# Patient Record
Sex: MALE | Race: WHITE | Employment: FULL TIME | URBAN - METROPOLITAN AREA
[De-identification: names, ages, dates, MRNs, and addresses within clinical notes are randomized per-mention and may not be internally consistent; named-entity substitution may affect disease eponyms.]

---

## 2024-11-20 ENCOUNTER — HOSPITAL ENCOUNTER (INPATIENT)
Facility: HOSPITAL | Age: 54
LOS: 2 days | Discharge: HOME | End: 2024-11-22
Attending: EMERGENCY MEDICINE | Admitting: HOSPITALIST
Payer: COMMERCIAL

## 2024-11-20 ENCOUNTER — APPOINTMENT (OUTPATIENT)
Dept: GENERAL RADIOLOGY | Facility: HOSPITAL | Age: 54
End: 2024-11-20
Attending: EMERGENCY MEDICINE
Payer: COMMERCIAL

## 2024-11-20 ENCOUNTER — HOSPITAL ENCOUNTER (INPATIENT)
Facility: HOSPITAL | Age: 54
LOS: 2 days | Discharge: HOME | DRG: 638 | End: 2024-11-22
Attending: EMERGENCY MEDICINE | Admitting: HOSPITALIST
Payer: COMMERCIAL

## 2024-11-20 ENCOUNTER — APPOINTMENT (OUTPATIENT)
Dept: GENERAL RADIOLOGY | Facility: HOSPITAL | Age: 54
DRG: 638 | End: 2024-11-20
Attending: EMERGENCY MEDICINE
Payer: COMMERCIAL

## 2024-11-20 DIAGNOSIS — E11.65 TYPE 2 DIABETES MELLITUS WITH HYPERGLYCEMIA, WITHOUT LONG-TERM CURRENT USE OF INSULIN (HCC): Primary | ICD-10-CM

## 2024-11-20 LAB
ANION GAP SERPL CALC-SCNC: 9 MMOL/L (ref 0–18)
BASOPHILS # BLD AUTO: 0.06 X10(3) UL (ref 0–0.2)
BASOPHILS NFR BLD AUTO: 0.8 %
BILIRUB UR QL: NEGATIVE
BUN BLD-MCNC: 33 MG/DL (ref 9–23)
BUN/CREAT SERPL: 24.1 (ref 10–20)
CALCIUM BLD-MCNC: 10.2 MG/DL (ref 8.7–10.4)
CHLORIDE SERPL-SCNC: 100 MMOL/L (ref 98–112)
CLARITY UR: CLEAR
CO2 SERPL-SCNC: 20 MMOL/L (ref 21–32)
COLOR UR: COLORLESS
CREAT BLD-MCNC: 1.37 MG/DL
DEPRECATED RDW RBC AUTO: 42.5 FL (ref 35.1–46.3)
EGFRCR SERPLBLD CKD-EPI 2021: 62 ML/MIN/1.73M2 (ref 60–?)
EOSINOPHIL # BLD AUTO: 0.14 X10(3) UL (ref 0–0.7)
EOSINOPHIL NFR BLD AUTO: 1.9 %
ERYTHROCYTE [DISTWIDTH] IN BLOOD BY AUTOMATED COUNT: 13.7 % (ref 11–15)
EST. AVERAGE GLUCOSE BLD GHB EST-MCNC: 309 MG/DL (ref 68–126)
EST. AVERAGE GLUCOSE BLD GHB EST-MCNC: 309 MG/DL (ref 68–126)
GLUCOSE BLD-MCNC: 578 MG/DL (ref 70–99)
GLUCOSE BLDC GLUCOMTR-MCNC: 156 MG/DL (ref 70–99)
GLUCOSE BLDC GLUCOMTR-MCNC: 217 MG/DL (ref 70–99)
GLUCOSE BLDC GLUCOMTR-MCNC: 222 MG/DL (ref 70–99)
GLUCOSE BLDC GLUCOMTR-MCNC: 284 MG/DL (ref 70–99)
GLUCOSE BLDC GLUCOMTR-MCNC: 326 MG/DL (ref 70–99)
GLUCOSE BLDC GLUCOMTR-MCNC: 354 MG/DL (ref 70–99)
GLUCOSE BLDC GLUCOMTR-MCNC: 443 MG/DL (ref 70–99)
GLUCOSE BLDC GLUCOMTR-MCNC: 582 MG/DL (ref 70–99)
GLUCOSE UR-MCNC: >1000 MG/DL
HBA1C MFR BLD: 12.4 % (ref ?–5.7)
HBA1C MFR BLD: 12.4 % (ref ?–5.7)
HCT VFR BLD AUTO: 47.8 %
HGB BLD-MCNC: 15.8 G/DL
HGB UR QL STRIP.AUTO: NEGATIVE
IMM GRANULOCYTES # BLD AUTO: 0.03 X10(3) UL (ref 0–1)
IMM GRANULOCYTES NFR BLD: 0.4 %
KETONES UR-MCNC: NEGATIVE MG/DL
LEUKOCYTE ESTERASE UR QL STRIP.AUTO: NEGATIVE
LYMPHOCYTES # BLD AUTO: 1.81 X10(3) UL (ref 1–4)
LYMPHOCYTES NFR BLD AUTO: 25.2 %
MCH RBC QN AUTO: 27.9 PG (ref 26–34)
MCHC RBC AUTO-ENTMCNC: 33.1 G/DL (ref 31–37)
MCV RBC AUTO: 84.3 FL
MONOCYTES # BLD AUTO: 0.49 X10(3) UL (ref 0.1–1)
MONOCYTES NFR BLD AUTO: 6.8 %
NEUTROPHILS # BLD AUTO: 4.65 X10 (3) UL (ref 1.5–7.7)
NEUTROPHILS # BLD AUTO: 4.65 X10(3) UL (ref 1.5–7.7)
NEUTROPHILS NFR BLD AUTO: 64.9 %
NITRITE UR QL STRIP.AUTO: NEGATIVE
OSMOLALITY SERPL CALC.SUM OF ELEC: 302 MOSM/KG (ref 275–295)
PH UR: 5 [PH] (ref 5–8)
PLATELET # BLD AUTO: 288 10(3)UL (ref 150–450)
POTASSIUM SERPL-SCNC: 5 MMOL/L (ref 3.5–5.1)
PROT UR-MCNC: NEGATIVE MG/DL
RBC # BLD AUTO: 5.67 X10(6)UL
SODIUM SERPL-SCNC: 129 MMOL/L (ref 136–145)
SP GR UR STRIP: 1.03 (ref 1–1.03)
UROBILINOGEN UR STRIP-ACNC: NORMAL
WBC # BLD AUTO: 7.2 X10(3) UL (ref 4–11)

## 2024-11-20 PROCEDURE — 82962 GLUCOSE BLOOD TEST: CPT

## 2024-11-20 PROCEDURE — 96366 THER/PROPH/DIAG IV INF ADDON: CPT

## 2024-11-20 PROCEDURE — 71045 X-RAY EXAM CHEST 1 VIEW: CPT | Performed by: EMERGENCY MEDICINE

## 2024-11-20 PROCEDURE — 81003 URINALYSIS AUTO W/O SCOPE: CPT | Performed by: EMERGENCY MEDICINE

## 2024-11-20 PROCEDURE — 99291 CRITICAL CARE FIRST HOUR: CPT

## 2024-11-20 PROCEDURE — 87641 MR-STAPH DNA AMP PROBE: CPT | Performed by: HOSPITALIST

## 2024-11-20 PROCEDURE — 96361 HYDRATE IV INFUSION ADD-ON: CPT

## 2024-11-20 PROCEDURE — 80048 BASIC METABOLIC PNL TOTAL CA: CPT

## 2024-11-20 PROCEDURE — 99285 EMERGENCY DEPT VISIT HI MDM: CPT

## 2024-11-20 PROCEDURE — 85025 COMPLETE CBC W/AUTO DIFF WBC: CPT

## 2024-11-20 PROCEDURE — 85025 COMPLETE CBC W/AUTO DIFF WBC: CPT | Performed by: EMERGENCY MEDICINE

## 2024-11-20 PROCEDURE — 96365 THER/PROPH/DIAG IV INF INIT: CPT

## 2024-11-20 PROCEDURE — 83036 HEMOGLOBIN GLYCOSYLATED A1C: CPT | Performed by: HOSPITALIST

## 2024-11-20 PROCEDURE — 80048 BASIC METABOLIC PNL TOTAL CA: CPT | Performed by: EMERGENCY MEDICINE

## 2024-11-20 RX ORDER — ACETAMINOPHEN 500 MG
1000 TABLET ORAL EVERY 6 HOURS PRN
Status: DISCONTINUED | OUTPATIENT
Start: 2024-11-20 | End: 2024-11-22

## 2024-11-20 RX ORDER — ENOXAPARIN SODIUM 100 MG/ML
0.5 INJECTION SUBCUTANEOUS 2 TIMES DAILY
Status: DISCONTINUED | OUTPATIENT
Start: 2024-11-21 | End: 2024-11-22

## 2024-11-20 RX ORDER — SENNOSIDES 8.6 MG
17.2 TABLET ORAL NIGHTLY PRN
Status: DISCONTINUED | OUTPATIENT
Start: 2024-11-20 | End: 2024-11-22

## 2024-11-20 RX ORDER — ROSUVASTATIN CALCIUM 40 MG/1
40 TABLET, COATED ORAL DAILY
COMMUNITY

## 2024-11-20 RX ORDER — POLYETHYLENE GLYCOL 3350 17 G/17G
17 POWDER, FOR SOLUTION ORAL DAILY PRN
Status: DISCONTINUED | OUTPATIENT
Start: 2024-11-20 | End: 2024-11-22

## 2024-11-20 RX ORDER — BISACODYL 10 MG
10 SUPPOSITORY, RECTAL RECTAL
Status: DISCONTINUED | OUTPATIENT
Start: 2024-11-20 | End: 2024-11-22

## 2024-11-20 RX ORDER — GLIPIZIDE 10 MG/1
10 TABLET, FILM COATED, EXTENDED RELEASE ORAL DAILY
COMMUNITY
End: 2024-11-22

## 2024-11-20 RX ORDER — PIOGLITAZONE 15 MG/1
15 TABLET ORAL DAILY
COMMUNITY
End: 2024-11-22

## 2024-11-20 RX ORDER — ROSUVASTATIN CALCIUM 20 MG/1
40 TABLET, COATED ORAL DAILY
Status: DISCONTINUED | OUTPATIENT
Start: 2024-11-20 | End: 2024-11-22

## 2024-11-20 RX ORDER — ONDANSETRON 2 MG/ML
4 INJECTION INTRAMUSCULAR; INTRAVENOUS EVERY 6 HOURS PRN
Status: DISCONTINUED | OUTPATIENT
Start: 2024-11-20 | End: 2024-11-22

## 2024-11-20 RX ORDER — LISINOPRIL 5 MG/1
5 TABLET ORAL DAILY
COMMUNITY

## 2024-11-20 RX ORDER — NICOTINE POLACRILEX 4 MG
30 LOZENGE BUCCAL
Status: DISCONTINUED | OUTPATIENT
Start: 2024-11-20 | End: 2024-11-22

## 2024-11-20 RX ORDER — SODIUM CHLORIDE 9 MG/ML
INJECTION, SOLUTION INTRAVENOUS CONTINUOUS
Status: DISCONTINUED | OUTPATIENT
Start: 2024-11-20 | End: 2024-11-21

## 2024-11-20 RX ORDER — METFORMIN HYDROCHLORIDE 500 MG/1
500 TABLET, EXTENDED RELEASE ORAL DAILY
COMMUNITY

## 2024-11-20 RX ORDER — DEXTROSE MONOHYDRATE 25 G/50ML
50 INJECTION, SOLUTION INTRAVENOUS
Status: DISCONTINUED | OUTPATIENT
Start: 2024-11-20 | End: 2024-11-22

## 2024-11-20 RX ORDER — NICOTINE POLACRILEX 4 MG
15 LOZENGE BUCCAL
Status: DISCONTINUED | OUTPATIENT
Start: 2024-11-20 | End: 2024-11-22

## 2024-11-20 NOTE — ED INITIAL ASSESSMENT (HPI)
Pt ambulatory to ED A&O x 4 w/ c/o high blood sugar, fatigue, generalized weakness, polyuria x 1 week.  Pt hx T2DM, checked blood sugar at home and meter read, \"high.\"  Pt denies any n/v.

## 2024-11-20 NOTE — ED PROVIDER NOTES
Patient Seen in: [facility] Emergency Department      History     Chief Complaint   Patient presents with    Hyperglycemia     Stated Complaint: Hyperglycemia    Subjective:   HPI      54-year-old male with history of hypertension, diabetes, and hyperlipidemia presents with complaints of generalized weakness, fatigue, and elevated blood glucose levels.  The patient reports over the past week and a half he has had increased weakness, fatigue, and urinary frequency.  He checked his blood glucose level today and his meter read high.  He does not check his blood glucose levels often.  Presently on Actos and metformin for his diabetes.    Objective:     Past Medical History:    Diabetes (HCC)    Essential hypertension    Hyperlipidemia              History reviewed. No pertinent surgical history.             Social History     Socioeconomic History    Marital status:    Tobacco Use    Smoking status: Never    Smokeless tobacco: Never   Vaping Use    Vaping status: Never Used   Substance and Sexual Activity    Alcohol use: Never    Drug use: Never                  Physical Exam     ED Triage Vitals   BP 11/20/24 1050 136/71   Pulse 11/20/24 1050 85   Resp 11/20/24 1050 16   Temp 11/20/24 1051 98 °F (36.7 °C)   Temp src 11/20/24 1051 Temporal   SpO2 11/20/24 1050 96 %   O2 Device 11/20/24 1050 None (Room air)       Current Vitals:   Vital Signs  BP: 135/75  Pulse: 87  Resp: 16  Temp: 98 °F (36.7 °C)  Temp src: Temporal    Oxygen Therapy  SpO2: 95 %  O2 Device: None (Room air)        Physical Exam    General Appearance: alert, no distress  Eyes: pupils equal and round no pallor or injection  ENT, Mouth: mucous membranes moist  Respiratory: there are no retractions, lungs are clear to auscultation  Cardiovascular: regular rate and rhythm  Gastrointestinal:  abdomen is soft and non tender, no masses, bowel sounds normal  Neurological: Speech normal.  Moving extremities x 4.  Skin: warm and dry, no  rashes.  Musculoskeletal: neck is supple non tender        Extremities are symmetrical, full range of motion.  No leg edema or tenderness noted.  Psychiatric: patient is oriented X 3, there is no agitation    DIFFERENTIAL DIAGNOSIS: After history and physical exam differential diagnosis was considered for hyperglycemia, infectious etiology, or other        ED Course     Labs Reviewed   BASIC METABOLIC PANEL (8) - Abnormal; Notable for the following components:       Result Value    Glucose 578 (*)     Sodium 129 (*)     CO2 20.0 (*)     BUN 33 (*)     Creatinine 1.37 (*)     BUN/CREA Ratio 24.1 (*)     Calculated Osmolality 302 (*)     All other components within normal limits   POCT GLUCOSE - Abnormal; Notable for the following components:    POC Glucose  582 (*)     All other components within normal limits   CBC WITH DIFFERENTIAL WITH PLATELET   URINALYSIS WITH CULTURE REFLEX   RAINBOW DRAW LAVENDER   RAINBOW DRAW LIGHT GREEN   RAINBOW DRAW BLUE   RAINBOW DRAW GOLD                 MDM      Lab results noted.  Patient with hyperglycemia but no evidence of DKA.  Given the significant elevated glucose levels, will begin an insulin drip along with IV fluids.  Plan to admit to PCU.  Discussed with Dr. Rosas, hospitalist.  Also communicated with Dr. Fair, endocrinology.    I spent a total of 30 minutes of critical care time in obtaining history, performing a physical exam, bedside monitoring of interventions, collecting and interpreting tests and discussion with consultants but not including time spent performing procedures.      Admission disposition: 11/20/2024 12:40 PM           Medical Decision Making      Disposition and Plan     Clinical Impression:  1. Type 2 diabetes mellitus with hyperglycemia, without long-term current use of insulin (HCC)         Disposition:  Admit  11/20/2024 12:40 pm    Follow-up:  No follow-up provider specified.        Medications Prescribed:  Current Discharge Medication List               Supplementary Documentation:         Hospital Problems       Present on Admission             ICD-10-CM Noted POA    * (Principal) Type 2 diabetes mellitus with hyperglycemia, without long-term current use of insulin (HCC) E11.65 11/20/2024 Unknown

## 2024-11-20 NOTE — ED QUICK NOTES
Report given to next shift RN Swathi in ICU, endorsed care next BG check handed off for 1515. Pt transported in no acute distress, even respirations on continuous monitoring.

## 2024-11-20 NOTE — H&P
[facility] Hospitalist H&P       CC:   Chief Complaint   Patient presents with    Hyperglycemia        PCP: LESVIA MARTINEZ    History of Present Illness: Mr. Rivas is a 54 year old male with PMH sig for Type 2 DM, HLD, HTN, who presents with polyuria, polydipsia, fatigue.  Patient states for the past 2 weeks he has felt more fatigued, generally weak, noted sig polyuria and polydipsia, he notes a poor appetitie, and some nausea.  He denies vomiting, no CP or sob, no fevers or chills, no HA or vision changes.  He noted his blood sugar (he doesn't normally check) was 'high' used his wife's glucometer which stated 'high' as well so he came to the ER.  No other complaints.       Patient admits to drinking 4-5 cans of soda per day as well as cookies and candy on a daily basis.        PMH  Past Medical History:    Diabetes (HCC)    Essential hypertension    Hyperlipidemia        PSH  History reviewed. No pertinent surgical history.     ALL:  Allergies[1]     Home Medications:  Medications Taking[2]      Soc Hx  Social History     Tobacco Use    Smoking status: Never    Smokeless tobacco: Never   Substance Use Topics    Alcohol use: Never        Fam Hx  No family history on file.    Review of Systems  Comprehensive ROS reviewed and negative except for what's stated above.     OBJECTIVE:  /67   Pulse 84   Temp 98 °F (36.7 °C) (Temporal)   Resp 17   Ht 4' 11\" (1.499 m)   Wt 225 lb (102.1 kg)   SpO2 96%   BMI 45.44 kg/m²   General:  Alert, no distress   Head:  Normocephalic, without obvious abnormality, atraumatic.   Eyes:  Sclera anicteric, No conjunctival pallor,    Nose: Nares normal. Septum midline. Mucosa normal. No drainage.   Throat: Lips, mucosa, and tongue normal. Teeth and gums normal.   Neck: Supple,     Lungs:   Clear to auscultation bilaterally. Normal effort   Chest wall:  No tenderness or deformity.   Heart:  Regular rate and rhythm, S1, S2 normal, no murmur, rub or gallop appreciated    Abdomen:   Soft, non-tender. Bowel sounds normal. No masses,  No organomegaly. Non distended   Extremities: Extremities normal, atraumatic, no cyanosis or edema.   Skin: Skin color, texture, turgor normal. No rashes or lesions.    Neurologic: Normal strength, no focal deficit appreciated     Diagnostic Data:    CBC/Chem  Recent Labs   Lab 11/20/24  1133   WBC 7.2   HGB 15.8   MCV 84.3   .0       Recent Labs   Lab 11/20/24  1133   *   K 5.0      CO2 20.0*   BUN 33*   CREATSERUM 1.37*   *   CA 10.2       No results for input(s): \"ALT\", \"AST\", \"ALB\", \"AMYLASE\", \"LIPASE\", \"LDH\" in the last 168 hours.    Invalid input(s): \"ALPHOS\", \"TBIL\", \"DBIL\", \"TPROT\"    No results for input(s): \"TROP\" in the last 168 hours.     Radiology: XR CHEST AP PORTABLE  (CPT=71045)    Result Date: 11/20/2024  CONCLUSION:  1. No acute cardiopulmonary disease    Dictated by (CST): Brennan Kinsey MD on 11/20/2024 at 1:24 PM     Finalized by (CST): Brennan Kinsey MD on 11/20/2024 at 1:26 PM             ASSESSMENT / PLAN:    Mr. Rivas is a 54 year old male with PMH sig for Type 2 DM, HLD, HTN, who presents with polyuria, polydipsia, fatigue.     Hyperglycemia / uncontrolled type 2 DM  - has not had A1c done since 2023  - takes actose, metformin, glipizide, hold on admit  -  on admission, not in DKA  - patient has had sig dietary indiscretion, will have nutrition and DM educator meet with patient  - IVF, Insulin drip  - endo consulted  - likely insulin on DC  - no signs of infection    MALLORY  Pseudohyponatremia   - IVF  - hold ACE    HLD  - check lipids  - resume statin    Morbid obesity   - BMI 45  - maybe candidate for ozempic as OP  - fu with PCP    HTN  - resume ace on DC    FN:  - IVF: 100  - Diet: carb controlled    DVT Prophy:scd, lovneox  Lines: PIV    Dispo: pending clinical course    Discussed with wife at bedside     Outpatient records or previous hospital records reviewed.     Further  recommendations pending patient's clinical course.  [facility] hospitalist to continue to follow patient while in house    Patient and/or patient's family given opportunity to ask questions and note understanding and agreeing with therapeutic plan as outlined    Thank You,  Broderick Rosas MD    Hospitalist with [facility] Service number: [phone]         [1] No Known Allergies  [2]   Outpatient Medications Marked as Taking for the 11/20/24 encounter (Hospital Encounter)   Medication Sig Dispense Refill    pioglitazone 15 MG Oral Tab Take 1 tablet (15 mg total) by mouth daily.      lisinopril 5 MG Oral Tab Take 1 tablet (5 mg total) by mouth daily.      metFORMIN  MG Oral Tablet 24 Hr Take 1 tablet (500 mg total) by mouth daily.      rosuvastatin 40 MG Oral Tab Take 1 tablet (40 mg total) by mouth daily.      glipiZIDE ER 10 MG Oral Tablet 24 Hr Take 1 tablet (10 mg total) by mouth daily.

## 2024-11-20 NOTE — PLAN OF CARE
Pt is alert & or x 4, following cammands, pt has working glucometer at home. Sinus r. Accuchecks q 1 hr insulin drip continued. Home meds reviewed. Pt is npo. 0.9 at 100. Call light usage reeviewed. Much emitonol support.  Problem: Patient Centered Care  Goal: Patient preferences are identified and integrated in the patient's plan of care  Description: Interventions:  - What would you like us to know as we care for you?   - Provide timely, complete, and accurate information to patient/family  - Incorporate patient and family knowledge, values, beliefs, and cultural backgrounds into the planning and delivery of care  - Encourage patient/family to participate in care and decision-making at the level they choose  - Honor patient and family perspectives and choices  Outcome: Progressing     Problem: Diabetes/Glucose Control  Goal: Glucose maintained within prescribed range  Description: INTERVENTIONS:  - Monitor Blood Glucose as ordered  - Assess for signs and symptoms of hyperglycemia and hypoglycemia  - Administer ordered medications to maintain glucose within target range  - Assess barriers to adequate nutritional intake and initiate nutrition consult as needed  - Instruct patient on self management of diabetes  Outcome: Progressing     Problem: Patient/Family Goals  Goal: Patient/Family Long Term Goal  Description: Patient's Long Term Goal:     Interventions:  -  - See additional Care Plan goals for specific interventions  Outcome: Progressing  Goal: Patient/Family Short Term Goal  Description: Patient's Short Term Goal:    Interventions:     - See additional Care Plan goals for specific interventions  Outcome: Progressing     Problem: METABOLIC/FLUID AND ELECTROLYTES - ADULT  Goal: Glucose maintained within prescribed range  Description: INTERVENTIONS:  - Monitor Blood Glucose as ordered  - Assess for signs and symptoms of hyperglycemia and hypoglycemia  - Administer ordered medications to maintain glucose within  target range  - Assess barriers to adequate nutritional intake and initiate nutrition consult as needed  - Instruct patient on self management of diabetes  Outcome: Progressing  Goal: Electrolytes maintained within normal limits  Description: INTERVENTIONS:  - Monitor labs and rhythm and assess patient for signs and symptoms of electrolyte imbalances  - Administer electrolyte replacement as ordered  - Monitor response to electrolyte replacements, including rhythm and repeat lab results as appropriate  - Fluid restriction as ordered  - Instruct patient on fluid and nutrition restrictions as appropriate  Outcome: Progressing  Goal: Hemodynamic stability and optimal renal function maintained  Description: INTERVENTIONS:  - Monitor labs and assess for signs and symptoms of volume excess or deficit  - Monitor intake, output and patient weight  - Monitor urine specific gravity, serum osmolarity and serum sodium as indicated or ordered  - Monitor response to interventions for patient's volume status, including labs, urine output, blood pressure (other measures as available)  - Encourage oral intake as appropriate  - Instruct patient on fluid and nutrition restrictions as appropriate  Outcome: Progressing     Problem: SKIN/TISSUE INTEGRITY - ADULT  Goal: Skin integrity remains intact  Description: INTERVENTIONS  - Assess and document risk factors for pressure ulcer development  - Assess and document skin integrity  - Monitor for areas of redness and/or skin breakdown  - Initiate interventions, skin care algorithm/standards of care as needed  Outcome: Progressing

## 2024-11-20 NOTE — PROGRESS NOTES
enoxaparin adjusted per P&T protocol based on weight AND renal function  Estimated Creatinine Clearance: 90.1 mL/min (A) (based on SCr of 1.37 mg/dL (H)).   Body mass index is 45.44 kg/m².     *NOTE: If patient has BMI < 40 kg/m2 and CrCl < 30 mL/min, use Ivent Type: Renal Dose Adjustment; Subtype: Enoxaparin- Renal Adjustment   Agent Adjustment (BMI > 40 kg/m2)    CrCl > 30 mL/min CrCl < 30 mL/min Hemodialysis   Enoxaparin 0.5 mg/kg q12h 0.5 mg/kg once daily Heparin 5000 units subcutaneously q8h   Heparin 7500 units q8h* 5000 units q8h* 5000 units q8h*   * NOTE: if q12h is ordered, keep frequency at q12h

## 2024-11-21 LAB
ANION GAP SERPL CALC-SCNC: 7 MMOL/L (ref 0–18)
BASOPHILS # BLD AUTO: 0.05 X10(3) UL (ref 0–0.2)
BASOPHILS NFR BLD AUTO: 0.6 %
BUN BLD-MCNC: 22 MG/DL (ref 9–23)
BUN/CREAT SERPL: 22.2 (ref 10–20)
CALCIUM BLD-MCNC: 9.1 MG/DL (ref 8.7–10.4)
CHLORIDE SERPL-SCNC: 110 MMOL/L (ref 98–112)
CHOLEST SERPL-MCNC: 266 MG/DL (ref ?–200)
CO2 SERPL-SCNC: 22 MMOL/L (ref 21–32)
CREAT BLD-MCNC: 0.99 MG/DL
DEPRECATED RDW RBC AUTO: 43.8 FL (ref 35.1–46.3)
EGFRCR SERPLBLD CKD-EPI 2021: 91 ML/MIN/1.73M2 (ref 60–?)
EOSINOPHIL # BLD AUTO: 0.25 X10(3) UL (ref 0–0.7)
EOSINOPHIL NFR BLD AUTO: 3.1 %
ERYTHROCYTE [DISTWIDTH] IN BLOOD BY AUTOMATED COUNT: 14.1 % (ref 11–15)
GLUCOSE BLD-MCNC: 123 MG/DL (ref 70–99)
GLUCOSE BLDC GLUCOMTR-MCNC: 116 MG/DL (ref 70–99)
GLUCOSE BLDC GLUCOMTR-MCNC: 124 MG/DL (ref 70–99)
GLUCOSE BLDC GLUCOMTR-MCNC: 125 MG/DL (ref 70–99)
GLUCOSE BLDC GLUCOMTR-MCNC: 125 MG/DL (ref 70–99)
GLUCOSE BLDC GLUCOMTR-MCNC: 128 MG/DL (ref 70–99)
GLUCOSE BLDC GLUCOMTR-MCNC: 141 MG/DL (ref 70–99)
GLUCOSE BLDC GLUCOMTR-MCNC: 143 MG/DL (ref 70–99)
GLUCOSE BLDC GLUCOMTR-MCNC: 165 MG/DL (ref 70–99)
GLUCOSE BLDC GLUCOMTR-MCNC: 165 MG/DL (ref 70–99)
GLUCOSE BLDC GLUCOMTR-MCNC: 204 MG/DL (ref 70–99)
GLUCOSE BLDC GLUCOMTR-MCNC: 210 MG/DL (ref 70–99)
GLUCOSE BLDC GLUCOMTR-MCNC: 216 MG/DL (ref 70–99)
GLUCOSE BLDC GLUCOMTR-MCNC: 217 MG/DL (ref 70–99)
GLUCOSE BLDC GLUCOMTR-MCNC: 221 MG/DL (ref 70–99)
GLUCOSE BLDC GLUCOMTR-MCNC: 230 MG/DL (ref 70–99)
GLUCOSE BLDC GLUCOMTR-MCNC: 259 MG/DL (ref 70–99)
HCT VFR BLD AUTO: 43.9 %
HDLC SERPL-MCNC: 21 MG/DL (ref 40–59)
HGB BLD-MCNC: 14.6 G/DL
IMM GRANULOCYTES # BLD AUTO: 0.03 X10(3) UL (ref 0–1)
IMM GRANULOCYTES NFR BLD: 0.4 %
LDLC SERPL CALC-MCNC: 113 MG/DL (ref ?–100)
LYMPHOCYTES # BLD AUTO: 2.9 X10(3) UL (ref 1–4)
LYMPHOCYTES NFR BLD AUTO: 36.2 %
MAGNESIUM SERPL-MCNC: 2 MG/DL (ref 1.6–2.6)
MCH RBC QN AUTO: 28.3 PG (ref 26–34)
MCHC RBC AUTO-ENTMCNC: 33.3 G/DL (ref 31–37)
MCV RBC AUTO: 85.2 FL
MONOCYTES # BLD AUTO: 0.67 X10(3) UL (ref 0.1–1)
MONOCYTES NFR BLD AUTO: 8.4 %
MRSA DNA SPEC QL NAA+PROBE: NEGATIVE
NEUTROPHILS # BLD AUTO: 4.12 X10 (3) UL (ref 1.5–7.7)
NEUTROPHILS # BLD AUTO: 4.12 X10(3) UL (ref 1.5–7.7)
NEUTROPHILS NFR BLD AUTO: 51.3 %
NONHDLC SERPL-MCNC: 245 MG/DL (ref ?–130)
OSMOLALITY SERPL CALC.SUM OF ELEC: 293 MOSM/KG (ref 275–295)
PLATELET # BLD AUTO: 261 10(3)UL (ref 150–450)
POTASSIUM SERPL-SCNC: 3.5 MMOL/L (ref 3.5–5.1)
RBC # BLD AUTO: 5.15 X10(6)UL
SODIUM SERPL-SCNC: 139 MMOL/L (ref 136–145)
TRIGL SERPL-MCNC: 736 MG/DL (ref 30–149)
VLDLC SERPL CALC-MCNC: 137 MG/DL (ref 0–30)
WBC # BLD AUTO: 8 X10(3) UL (ref 4–11)

## 2024-11-21 PROCEDURE — 82962 GLUCOSE BLOOD TEST: CPT

## 2024-11-21 PROCEDURE — 80048 BASIC METABOLIC PNL TOTAL CA: CPT | Performed by: HOSPITALIST

## 2024-11-21 PROCEDURE — 80061 LIPID PANEL: CPT | Performed by: HOSPITALIST

## 2024-11-21 PROCEDURE — 83735 ASSAY OF MAGNESIUM: CPT | Performed by: HOSPITALIST

## 2024-11-21 PROCEDURE — 85025 COMPLETE CBC W/AUTO DIFF WBC: CPT | Performed by: HOSPITALIST

## 2024-11-21 RX ORDER — INSULIN DEGLUDEC 100 U/ML
50 INJECTION, SOLUTION SUBCUTANEOUS DAILY
Status: DISCONTINUED | OUTPATIENT
Start: 2024-11-21 | End: 2024-11-22

## 2024-11-21 NOTE — DISCHARGE INSTRUCTIONS
Please check your blood sugars closely.    Please follow up with your PCP and endocrinologist in 1 week    If INSULIN PEN is ordered at discharge, remember to remove TWO caps from the needle before injecting.     Diabetes:  Your A1C level is greater than 8%.  It is recommended that you attend an outpatient diabetes education program.  Please discuss with your Primary Care Provider at your next visit to obtain a referral.  If you wish to make an appointment at [facility], call [phone].

## 2024-11-21 NOTE — DIABETES ED
[facility]    Diabetes Education  Note    Javon Rivas III Patient Status:  Inpatient   1970 MRN [id number]  Location [facility] 2W/SW Attending Broderick Rosas MD  Hosp Day # 1 PCP LESVIA MARTINEZ      Labs:    HgbA1C (%)   Date Value   2024 12.4 (H)         Reason for Visit:Md order. New to insulin  Met with patient in room. He indicates he has had diabetes for several years, stopped using a glucose machine however takes diabetes medications as prescribed.    Discussed benefits of monitoring blood sugar and instructed on blood sugar targets. He is unsure of expiration date on test strips and encouraged him to review information.  Discussed benefits of Continuous Glucose monitor and encouraged him to request order from PCP.    He consumes 2 meals per day, drinks regular soda and consumes large portions of pasta, breads and rice.  Patient educated regarding diabetes diet basics. Discussed carbohydrate foods, portion sizes, and food label reading.  Handout given and initial meal plan provided. Encouraged to attend outpatient diabetes education. Questions answered. Receptive to information.    He has self administered insulin with RN supervision today.  He did a return demonstration of insulin technique with demo pad and pen without difficulty. Instructed on insulin storage, site selection and site rotation.He is agreeable to continue to self administer insulin with RN supervision during hospitalization.        Education Provided:  Benefits of testing BG as directed - record results and report to MD  How to inject using insulin pen and removing 2 caps from home insulin pen needle  Hypoglycemia symptoms/treatment/prevention  Actions of long and rapid acting insulins  Basic Diet Guidelines  Beginning carb counting - initial meal plan provided  Importance of close follow up with PCP and medical team  Benefits of physical activity     Patient verbalized understanding and was  receptive to information provided.      Recommendations:  Patient to self-administer all insulin doses with RN supervision   Attend outpatient diabetes education          Lennie Miller RN  Diabetes Educator  11/21/2024  3:01 PM

## 2024-11-21 NOTE — PLAN OF CARE
Pt is alert & or x r. Following cammands, sinus r.  Pt is getting lovenox daily. Pt was on insulin drip,  now off. Reviewed diabetic booklet with pt. Diet. Showed pt how to draw up insulin from novolog and degludec  insulin pens, With diabetic booklet at bedside.  Pt was able to demonstrate how to use both insulin pens, how to clean abdomen, cleanse site. Ask dr in future about CGM for future monitoring. Pt has working glucometer at home.  Eating a low carb diet. Looking at labels of food. Reviewed call light usage. Emotional support. Saline lock iv site. Pt will transfer to medical floor when bed is available. Reviewed call light usage. No c/o of pain. No skin issues.  Problem: Patient Centered Care  Goal: Patient preferences are identified and integrated in the patient's plan of care  Description: Interventions:  - What would you like us to know as we care for you?  - Provide timely, complete, and accurate information to patient/family  - Incorporate patient and family knowledge, values, beliefs, and cultural backgrounds into the planning and delivery of care  - Encourage patient/family to participate in care and decision-making at the level they choose  - Honor patient and family perspectives and choices  Outcome: Progressing     Problem: Diabetes/Glucose Control  Goal: Glucose maintained within prescribed range  Description: INTERVENTIONS:  - Monitor Blood Glucose as ordered  - Assess for signs and symptoms of hyperglycemia and hypoglycemia  - Administer ordered medications to maintain glucose within target range  - Assess barriers to adequate nutritional intake and initiate nutrition consult as needed  - Instruct patient on self management of diabetes  Outcome: Progressing     Problem: Patient/Family Goals  Goal: Patient/Family Long Term Goal  Description: Patient's Long Term Goal:    Interventions:    - See additional Care Plan goals for specific interventions  Outcome: Progressing  Goal: Patient/Family Short  Term Goal  Description: Patient's Short Term Goal:    Interventions:   - See additional Care Plan goals for specific interventions  Outcome: Progressing     Problem: METABOLIC/FLUID AND ELECTROLYTES - ADULT  Goal: Glucose maintained within prescribed range  Description: INTERVENTIONS:  - Monitor Blood Glucose as ordered  - Assess for signs and symptoms of hyperglycemia and hypoglycemia  - Administer ordered medications to maintain glucose within target range  - Assess barriers to adequate nutritional intake and initiate nutrition consult as needed  - Instruct patient on self management of diabetes  Outcome: Progressing  Goal: Electrolytes maintained within normal limits  Description: INTERVENTIONS:  - Monitor labs and rhythm and assess patient for signs and symptoms of electrolyte imbalances  - Administer electrolyte replacement as ordered  - Monitor response to electrolyte replacements, including rhythm and repeat lab results as appropriate  - Fluid restriction as ordered  - Instruct patient on fluid and nutrition restrictions as appropriate  Outcome: Progressing  Goal: Hemodynamic stability and optimal renal function maintained  Description: INTERVENTIONS:  - Monitor labs and assess for signs and symptoms of volume excess or deficit  - Monitor intake, output and patient weight  - Monitor urine specific gravity, serum osmolarity and serum sodium as indicated or ordered  - Monitor response to interventions for patient's volume status, including labs, urine output, blood pressure (other measures as available)  - Encourage oral intake as appropriate  - Instruct patient on fluid and nutrition restrictions as appropriate  Outcome: Progressing     Problem: SKIN/TISSUE INTEGRITY - ADULT  Goal: Skin integrity remains intact  Description: INTERVENTIONS  - Assess and document risk factors for pressure ulcer development  - Assess and document skin integrity  - Monitor for areas of redness and/or skin breakdown  - Initiate  interventions, skin care algorithm/standards of care as needed  Outcome: Progressing

## 2024-11-21 NOTE — DIETARY NOTE
NUTRITION NOTE:     11/21/24: CONSULT for DM education.Hgb A1c: 12.4 on 11/20.  Pt was seen and education initiated by RN Diabetes Educator today. Please refer to note for specifics. Pt was encouraged to attend outpatient diabetes education and provided with phone number to make appointment and handouts including initial meal plan. Current diet rx: 1800 kcals Carbohydrate controlled diet will promote safe weight loss as pt BMI 45.44 kg/m2 reflecting morbid obesity. Pt is eating well with good tolerance to diet. Transitioning off insulin GTT today to basal, mealtime, and insulin correction factor for BG control. CPM.     Kaylin Kwok RD, LDN, [facility] ([id number])

## 2024-11-21 NOTE — CONSULTS
[facility]  part of [facility]    Report of Endocrinology Consultation  [facility]    Javno Rivas III Patient Status:  Inpatient    1970 MRN [id number]   Location [facility] 2W/SW Attending Broderick Rosas MD   Hosp Day # 1 PCP LESVIA MARTINEZ     Date of Admission:  2024    Date of Consult: 2024    Reason for Consultation:    Management of uncontrolled diabetes    History of Present Illness:    Javon Rivas III is a very pleasant 54-year-old patient who presented with symptomatic hyperglycemia.  Reports feeling fatigued and developing polyuria and polydipsia for about a week prior to admission.  Reports that his glucose was reading high and his meter and he decided to go to ER.  Has a history of diabetes treated with oral agents including glipizide and metformin.  Admission labs showed.  Treated with IV insulin drip overnight.  Required 3 to 5 units/h in the last few hours.  Patient's lipid profile reviewed.  Cholesterol 266, HDL 21, triglycerides of 736, .  Worsening diabetes with suboptimal control.  Reports hemoglobin A1c running between 8 and 12.      History:  Past Medical History:    Diabetes (HCC)    Essential hypertension    Hyperlipidemia     History reviewed. No pertinent surgical history.  No family history on file.   reports that he has never smoked. He has never used smokeless tobacco. He reports that he does not drink alcohol and does not use drugs.    Allergies:  Allergies[1]    Medications:    Current Facility-Administered Medications:     rosuvastatin (Crestor) tab 40 mg, 40 mg, Oral, Daily    glucose (Dex4) 15 GM/59ML oral liquid 15 g, 15 g, Oral, Q15 Min PRN **OR** glucose (Glutose) 40% oral gel 15 g, 15 g, Oral, Q15 Min PRN **OR** glucose-vitamin C (Dex-4) chewable tab 4 tablet, 4 tablet, Oral, Q15 Min PRN **OR** dextrose 50% injection 50 mL, 50 mL, Intravenous, Q15 Min PRN **OR** glucose (Dex4) 15 GM/59ML oral  liquid 30 g, 30 g, Oral, Q15 Min PRN **OR** glucose (Glutose) 40% oral gel 30 g, 30 g, Oral, Q15 Min PRN **OR** glucose-vitamin C (Dex-4) chewable tab 8 tablet, 8 tablet, Oral, Q15 Min PRN    insulin regular human (Novolin R, Humulin R) 100 Units in sodium chloride 0.9% 100 mL standard infusion (100 mL), 1-28 Units/hr, Intravenous, Continuous    enoxaparin (Lovenox) 60 MG/0.6ML SUBQ injection 50 mg, 0.5 mg/kg, Subcutaneous, BID    acetaminophen (Tylenol Extra Strength) tab 1,000 mg, 1,000 mg, Oral, Q6H PRN    melatonin tab 3 mg, 3 mg, Oral, Nightly PRN    polyethylene glycol (PEG 3350) (Miralax) 17 g oral packet 17 g, 17 g, Oral, Daily PRN    sennosides (Senokot) tab 17.2 mg, 17.2 mg, Oral, Nightly PRN    bisacodyl (Dulcolax) 10 MG rectal suppository 10 mg, 10 mg, Rectal, Daily PRN    ondansetron (Zofran) 4 MG/2ML injection 4 mg, 4 mg, Intravenous, Q6H PRN  [unfilled]    ROS:  Constitutional: as in HPI  Eyes: negative  Ears, nose, mouth, throat, and face: negative  Respiratory: negative  Cardiovascular: negative  Gastrointestinal: negative  Genitourinary:negative  Integument/breast: negative  Hematologic/lymphatic: negative  Musculoskeletal:negative  Neurological: negative  Behavioral/Psych: negative  Endocrine: negative  Allergic/Immunologic: negative    Physical Exam:  Blood pressure 130/84, pulse 77, temperature 97 °F (36.1 °C), temperature source Temporal, resp. rate 22, height 4' 11\" (1.499 m), weight 225 lb (102.1 kg), SpO2 95%.    General: Alert, orientated x3.  Cooperative.  No apparent distress.  HEENT: RHONDA, EOMI, thyroid non-enlarged   Neck: Supple.  Lungs: Clear bilaterally.  Cardiac: Regular rate and rhythm.  Abdomen:  Bowel sounds present, normoactive.  Nontender. Central obesity  Extremities:  warm        Labs, Imaging and Ancillaries:    Lab Results   Component Value Date    WBC 8.0 11/21/2024    HGB 14.6 11/21/2024    HCT 43.9 11/21/2024    .0 11/21/2024    CREATSERUM 0.99 11/21/2024    BUN  22 11/21/2024     11/21/2024    K 3.5 11/21/2024     11/21/2024    CO2 22.0 11/21/2024     11/21/2024    CA 9.1 11/21/2024    MG 2.0 11/21/2024       Recent Labs   Lab 11/20/24  1133 11/21/24  0349   * 123*       XR CHEST AP PORTABLE  (CPT=71045)    Result Date: 11/20/2024  CONCLUSION:  1. No acute cardiopulmonary disease    Dictated by (CST): Brennan Kinsey MD on 11/20/2024 at 1:24 PM     Finalized by (CST): Brennan Kinsey MD on 11/20/2024 at 1:26 PM             Impression:    1.  Nonketotic hyperosmolar state and type 2 diabetes, resolved  2.  Uncontrolled type 2 diabetes  3.  Acute kidney injury, resolved  4.  Diabetic dyslipidemia  5.  Obesity  6.  SIDNEY    Plan:    Patient presents with symptomatic hyperglycemia.  Severely hyperglycemic on admission.  Hyperosmolar with acute kidney injury  Treated with IV insulin drip and IV fluids  Uncontrolled diabetes with severe elevation of hemoglobin A1c  Severe obesity  Will transition to subcu insulin  Excellent candidate for GLP-1 agonist and/or SGLT2 inhibitor  Will resume metformin upon discharge  Likely limited value to use of sulfonylurea, could continue pioglitazone, although may be contributory to weight gain.  Long discussion regarding dietary management    Discussed with patient's RN Hope    Thank you for this consultation        Billy Fair MD          [1] No Known Allergies

## 2024-11-21 NOTE — PROGRESS NOTES
[facility] Hospitalist Progress Note     CC: Hospital Follow up    PCP: LESVIA MARTINEZ       Assessment/Plan:     Principal Problem:    Type 2 diabetes mellitus with hyperglycemia, without long-term current use of insulin (HCC)    Mr. Rivas is a 54 year old male with PMH sig for Type 2 DM, HLD, HTN, who presents with polyuria, polydipsia, fatigue,      Hyperglycemia / uncontrolled type 2 DM  Hyperosmolar non ketotic acidosis   - has not had A1c done since 2023  - takes actose, metformin, glipizide, hold on admit  -  on admission, not in DKA  - patient has had sig dietary indiscretion, will have nutrition and DM educator meet with patient  - IVF, Insulin drip-> wean off  - endo consulted  - likely insulin on DC  - no signs of infection  - drip stopped, plan for subcutaneous insulin  - transfer to floor     MALLORY  Pseudohyponatremia   -improved stop IVF     HLD  - check lipids-> elevated  - resume statin     Morbid obesity   - BMI 45  - maybe candidate for ozempic as OP  - fu with PCP     HTN  - resume ace on DC     FN:  - IVF: stopped  - Diet: carb controlled     DVT Prophy:scd, lovneox  Lines: PIV     Dispo: transfer to floor, poss DC home tomorrow          Questions/concerns were discussed with patient and/or family by bedside.    Thank You,  Broderick Rosas MD    Hospitalist with [facility]     Subjective:     No CP, SOB, or N/V.  Feeling much better    OBJECTIVE:    Blood pressure 122/70, pulse 73, temperature 97 °F (36.1 °C), temperature source Temporal, resp. rate 23, height 4' 11\" (1.499 m), weight 225 lb (102.1 kg), SpO2 95%.    Temp:  [96.8 °F (36 °C)-98.2 °F (36.8 °C)] 97 °F (36.1 °C)  Pulse:  [66-91] 73  Resp:  [14-28] 23  BP: (110-156)/(67-86) 122/70  SpO2:  [91 %-96 %] 95 %      Intake/Output:    Intake/Output Summary (Last 24 hours) at 11/21/2024 0926  Last data filed at 11/21/2024 0600  Gross per 24 hour   Intake 3168.9 ml   Output 1900 ml   Net 1268.9 ml       Last 3 Weights    11/20/24 1050 225 lb (102.1 kg)       Exam   Gen: No acute distress, alert and oriented x3  Heent: NC AT, neck supple  Pulm: Lungs clear, normal respiratory effort  CV: Heart with regular rate and rhythm, no peripheral edema  Abd: Abdomen soft, nontender, nondistended  MSK: no clubbing, no cyanosis  Skin: no rashes or lesions  Neuro: AO*3, motor intact, no sensory deficits  Psyc: appropriate mood and affect      Data Review:       Labs:     Recent Labs   Lab 11/20/24  1133 11/21/24  0349   RBC 5.67 5.15   HGB 15.8 14.6   HCT 47.8 43.9   MCV 84.3 85.2   MCH 27.9 28.3   MCHC 33.1 33.3   RDW 13.7 14.1   NEPRELIM 4.65 4.12   WBC 7.2 8.0   .0 261.0         Recent Labs   Lab 11/20/24  1133 11/21/24  0349   * 123*   BUN 33* 22   CREATSERUM 1.37* 0.99   EGFRCR 62 91   CA 10.2 9.1   * 139   K 5.0 3.5    110   CO2 20.0* 22.0       No results for input(s): \"ALT\", \"AST\", \"ALB\", \"AMYLASE\", \"LIPASE\", \"LDH\" in the last 168 hours.    Invalid input(s): \"ALPHOS\", \"TBIL\", \"DBIL\", \"TPROT\"      Imaging:  XR CHEST AP PORTABLE  (CPT=71045)    Result Date: 11/20/2024  CONCLUSION:  1. No acute cardiopulmonary disease    Dictated by (CST): Brennan Kinsey MD on 11/20/2024 at 1:24 PM     Finalized by (CST): Brennan Kinsey MD on 11/20/2024 at 1:26 PM             Meds:      rosuvastatin  40 mg Oral Daily    enoxaparin  0.5 mg/kg Subcutaneous BID      insulin regular 7 Units/hr (11/21/24 0602)       glucose **OR** glucose **OR** glucose-vitamin C **OR** dextrose **OR** glucose **OR** glucose **OR** glucose-vitamin C    acetaminophen    melatonin    polyethylene glycol (PEG 3350)    sennosides    bisacodyl    ondansetron

## 2024-11-21 NOTE — PLAN OF CARE
Pt remains on insulin gtt.  Problem: Patient Centered Care  Goal: Patient preferences are identified and integrated in the patient's plan of care  Description: Interventions:  - What would you like us to know as we care for you? From home with wife  - Provide timely, complete, and accurate information to patient/family  - Incorporate patient and family knowledge, values, beliefs, and cultural backgrounds into the planning and delivery of care  - Encourage patient/family to participate in care and decision-making at the level they choose  - Honor patient and family perspectives and choices  Outcome: Progressing     Problem: Diabetes/Glucose Control  Goal: Glucose maintained within prescribed range  Description: INTERVENTIONS:  - Monitor Blood Glucose as ordered  - Assess for signs and symptoms of hyperglycemia and hypoglycemia  - Administer ordered medications to maintain glucose within target range  - Assess barriers to adequate nutritional intake and initiate nutrition consult as needed  - Instruct patient on self management of diabetes  Outcome: Progressing     Problem: Patient/Family Goals  Goal: Patient/Family Long Term Goal  Description: Patient's Long Term Goal: improve blood sugar control    Interventions:  - medication, diet and lifestyle modification, bg testing and monitoring, carb counting  - See additional Care Plan goals for specific interventions  Outcome: Progressing  Goal: Patient/Family Short Term Goal  Description: Patient's Short Term Goal: go home    Interventions:   - medication, labs and diagnostics  - See additional Care Plan goals for specific interventions  Outcome: Progressing     Problem: METABOLIC/FLUID AND ELECTROLYTES - ADULT  Goal: Glucose maintained within prescribed range  Description: INTERVENTIONS:  - Monitor Blood Glucose as ordered  - Assess for signs and symptoms of hyperglycemia and hypoglycemia  - Administer ordered medications to maintain glucose within target range  - Assess  barriers to adequate nutritional intake and initiate nutrition consult as needed  - Instruct patient on self management of diabetes  Outcome: Progressing  Goal: Electrolytes maintained within normal limits  Description: INTERVENTIONS:  - Monitor labs and rhythm and assess patient for signs and symptoms of electrolyte imbalances  - Administer electrolyte replacement as ordered  - Monitor response to electrolyte replacements, including rhythm and repeat lab results as appropriate  - Fluid restriction as ordered  - Instruct patient on fluid and nutrition restrictions as appropriate  Outcome: Progressing  Goal: Hemodynamic stability and optimal renal function maintained  Description: INTERVENTIONS:  - Monitor labs and assess for signs and symptoms of volume excess or deficit  - Monitor intake, output and patient weight  - Monitor urine specific gravity, serum osmolarity and serum sodium as indicated or ordered  - Monitor response to interventions for patient's volume status, including labs, urine output, blood pressure (other measures as available)  - Encourage oral intake as appropriate  - Instruct patient on fluid and nutrition restrictions as appropriate  Outcome: Progressing     Problem: SKIN/TISSUE INTEGRITY - ADULT  Goal: Skin integrity remains intact  Description: INTERVENTIONS  - Assess and document risk factors for pressure ulcer development  - Assess and document skin integrity  - Monitor for areas of redness and/or skin breakdown  - Initiate interventions, skin care algorithm/standards of care as needed  Outcome: Progressing

## 2024-11-22 VITALS
HEIGHT: 60 IN | RESPIRATION RATE: 18 BRPM | OXYGEN SATURATION: 96 % | WEIGHT: 225.38 LBS | BODY MASS INDEX: 44.25 KG/M2 | HEART RATE: 74 BPM | TEMPERATURE: 99 F | DIASTOLIC BLOOD PRESSURE: 86 MMHG | SYSTOLIC BLOOD PRESSURE: 144 MMHG

## 2024-11-22 LAB
ANION GAP SERPL CALC-SCNC: 8 MMOL/L (ref 0–18)
BASOPHILS # BLD AUTO: 0.06 X10(3) UL (ref 0–0.2)
BASOPHILS NFR BLD AUTO: 0.8 %
BUN BLD-MCNC: 17 MG/DL (ref 9–23)
CALCIUM BLD-MCNC: 9.1 MG/DL (ref 8.7–10.4)
CHLORIDE SERPL-SCNC: 105 MMOL/L (ref 98–112)
CO2 SERPL-SCNC: 23 MMOL/L (ref 21–32)
CREAT BLD-MCNC: 1.04 MG/DL
DEPRECATED RDW RBC AUTO: 45.1 FL (ref 35.1–46.3)
EGFRCR SERPLBLD CKD-EPI 2021: 86 ML/MIN/1.73M2 (ref 60–?)
EOSINOPHIL # BLD AUTO: 0.2 X10(3) UL (ref 0–0.7)
EOSINOPHIL NFR BLD AUTO: 2.8 %
ERYTHROCYTE [DISTWIDTH] IN BLOOD BY AUTOMATED COUNT: 14.1 % (ref 11–15)
GLUCOSE BLD-MCNC: 293 MG/DL (ref 70–99)
GLUCOSE BLDC GLUCOMTR-MCNC: 292 MG/DL (ref 70–99)
GLUCOSE BLDC GLUCOMTR-MCNC: 293 MG/DL (ref 70–99)
HCT VFR BLD AUTO: 45.1 %
HGB BLD-MCNC: 14.8 G/DL
IMM GRANULOCYTES # BLD AUTO: 0.03 X10(3) UL (ref 0–1)
IMM GRANULOCYTES NFR BLD: 0.4 %
LYMPHOCYTES # BLD AUTO: 2.19 X10(3) UL (ref 1–4)
LYMPHOCYTES NFR BLD AUTO: 30.8 %
MCH RBC QN AUTO: 28.3 PG (ref 26–34)
MCHC RBC AUTO-ENTMCNC: 32.8 G/DL (ref 31–37)
MCV RBC AUTO: 86.2 FL
MONOCYTES # BLD AUTO: 0.52 X10(3) UL (ref 0.1–1)
MONOCYTES NFR BLD AUTO: 7.3 %
NEUTROPHILS # BLD AUTO: 4.11 X10 (3) UL (ref 1.5–7.7)
NEUTROPHILS # BLD AUTO: 4.11 X10(3) UL (ref 1.5–7.7)
NEUTROPHILS NFR BLD AUTO: 57.9 %
PLATELET # BLD AUTO: 256 10(3)UL (ref 150–450)
POTASSIUM SERPL-SCNC: 4.8 MMOL/L (ref 3.5–5.1)
RBC # BLD AUTO: 5.23 X10(6)UL
SODIUM SERPL-SCNC: 136 MMOL/L (ref 136–145)
UFH PPP CHRO-ACNC: 0.42 IU/ML
WBC # BLD AUTO: 7.1 X10(3) UL (ref 4–11)

## 2024-11-22 PROCEDURE — 90471 IMMUNIZATION ADMIN: CPT

## 2024-11-22 PROCEDURE — 85520 HEPARIN ASSAY: CPT | Performed by: HOSPITALIST

## 2024-11-22 PROCEDURE — 82962 GLUCOSE BLOOD TEST: CPT

## 2024-11-22 PROCEDURE — 80048 BASIC METABOLIC PNL TOTAL CA: CPT | Performed by: HOSPITALIST

## 2024-11-22 PROCEDURE — 85025 COMPLETE CBC W/AUTO DIFF WBC: CPT | Performed by: HOSPITALIST

## 2024-11-22 RX ORDER — ENOXAPARIN SODIUM 100 MG/ML
40 INJECTION SUBCUTANEOUS 2 TIMES DAILY
Status: DISCONTINUED | OUTPATIENT
Start: 2024-11-22 | End: 2024-11-22

## 2024-11-22 RX ORDER — PEN NEEDLE, DIABETIC 30 GX3/16"
1 NEEDLE, DISPOSABLE MISCELLANEOUS 4 TIMES DAILY
Qty: 150 EACH | Refills: 11 | Status: SHIPPED
Start: 2024-11-22

## 2024-11-22 RX ORDER — INSULIN ASPART 100 [IU]/ML
18 INJECTION, SOLUTION INTRAVENOUS; SUBCUTANEOUS
Qty: 15 ML | Refills: 2 | Status: SHIPPED
Start: 2024-11-22

## 2024-11-22 RX ORDER — INSULIN DEGLUDEC 200 U/ML
50 INJECTION, SOLUTION SUBCUTANEOUS DAILY
Qty: 9 ML | Refills: 1 | Status: SHIPPED
Start: 2024-11-22

## 2024-11-22 NOTE — PROGRESS NOTES
Pharmacy Progress Note:  Anticoagulation Dose Adjustment     Javon Rivas III is a 54 year old male on enoxaparin for VTE prophylaxis.  Anti-factor Xa levels are being monitored due to the patient's high risk status of obesity.    Relevant labs:    Body mass index is 45.53 kg/m².    Wt Readings from Last 1 Encounters:   11/21/24 102.2 kg (225 lb 6.4 oz)       Lab Results   Component Value Date    CREATSERUM 1.04 11/22/2024       Heparin Anti Xa Assay   Date Value Ref Range Status   11/22/2024 0.42 IU/mL Final       Anti Xa level being assessed:  0.42 units/mL (Peak drawn 4 hours after dose).  Goal Peak Anti-Xa level:  (Enoxaparin prophylaxis: 0.2-0.4 unit/mL).  Goal Trough Anti-Xa level: </=0.5 unit/mL  (when applicable)    Based on the above, enoxaparin dose will be adjusted to 40mg BID, to begin 11/22 @2100 . Next Anti-factor Xa Peak drawn 4 hours after dosewill be ordered on 11/24 @1300 .    Thank you,  Rica Quintanilla, PharmD  11/22/2024 3:00 PM

## 2024-11-22 NOTE — DISCHARGE SUMMARY
General Medicine Discharge Summary     Patient ID:  Javon Rivas III  54 year old  1/14/1970    Admit date: 11/20/2024    Discharge date and time: 11/22/2024    Attending Physician: Broderick Rosas MD     Consults: IP CONSULT TO ENDOCRINOLOGY  EM CONSULT TO DIABETES EDUCATION  IP CONSULT TO FOOD AND NUTRITION SERVICES    Primary Care Physician: LESVIA MARTINEZ     Reason for admission: hyperglycemia     Risk For Readmission: low     Discharge Diagnoses: Type 2 diabetes mellitus with hyperglycemia, without long-term current use of insulin (HCC) [E11.65]  See Additional Discharge Diagnoses in Hospital Course    Discharged Condition: good    Follow-up with labs/images appointments: PCP, and endocrinology    Exam  Gen: No acute distress  Pulm: Lungs clear, normal respiratory effort  CV: Heart with regular rate and rhythm  Abd: Abdomen soft,   EXT: no edema     HPI:   History of Present Illness: Mr. Rivas is a 54 year old male with PMH sig for Type 2 DM, HLD, HTN, who presents with polyuria, polydipsia, fatigue.  Patient states for the past 2 weeks he has felt more fatigued, generally weak, noted sig polyuria and polydipsia, he notes a poor appetitie, and some nausea.  He denies vomiting, no CP or sob, no fevers or chills, no HA or vision changes.  He noted his blood sugar (he doesn't normally check) was 'high' used his wife's glucometer which stated 'high' as well so he came to the ER.  No other complaints.        Patient admits to drinking 4-5 cans of soda per day as well as cookies and candy on a daily basis.        Hospital Course:   Mr. Rivas is a 54 year old male with PMH sig for Type 2 DM, HLD, HTN, who presents with polyuria, polydipsia, fatigue, found to have HONK, started IVF, Insulin drip with improvement, seen by endocrine, transitioned to basal bolus regimen, also seen by dietitian, and DM educator, instructed on subcutaneous insulin.  Vitals stable sugars improved, cleared by endocrine for DC home plan for  FU with PCP and endo on DC.       Hyperglycemia / uncontrolled type 2 DM  Hyperosmolar non ketotic acidosis   - has not had A1c done since 2023 --> A1C here 12.4  - takes actose, metformin, glipizide,   -  on admission, not in DKA  - improved on inulin drip, weaned off on to basal bolus regimen   - endo consulted  - no signs of infection  - met with dietian, and DM educator      MALLORY  Pseudohyponatremia   -resolved     HLD  - check lipids-> elevated  - resume statin  - FU ith PCP     Morbid obesity   - BMI 45  - maybe candidate for ozempic as OP - > fu outpatient   - fu with PCP     HTN  - resume ace on DC    Operative Procedures:      Imaging: XR CHEST AP PORTABLE  (CPT=71045)    Result Date: 11/20/2024  CONCLUSION:  1. No acute cardiopulmonary disease    Dictated by (CST): Brennan Kinsey MD on 11/20/2024 at 1:24 PM     Finalized by (CST): Brennan Kinsey MD on 11/20/2024 at 1:26 PM             Disposition: home    Activity: activity as tolerated  Diet: regular diet  Wound Care: as directed  Code Status: Full Code      Home Medication Changes:     Med list     Medication List        START taking these medications      NovoLOG FlexPen 100 UNIT/ML Sopn  Generic drug: insulin aspart  Inject 18 Units into the skin 3 (three) times daily before meals.     Pen Needles 32G X 4 MM Misc  1 each in the morning, at noon, in the evening, and at bedtime.     Tresiba FlexTouch 200 UNIT/ML Sopn  Generic drug: insulin degludec  Inject 50 Units into the skin daily.            CONTINUE taking these medications      lisinopril 5 MG Tabs  Commonly known as: Prinivil; Zestril     metFORMIN  MG Tb24  Commonly known as: Glucophage XR     rosuvastatin 40 MG Tabs  Commonly known as: Crestor            STOP taking these medications      glipiZIDE ER 10 MG Tb24  Commonly known as: Glucotrol XL     pioglitazone 15 MG Tabs  Commonly known as: Actos               Where to Get Your Medications        You can get these  medications from any pharmacy    Bring a paper prescription for each of these medications  NovoLOG FlexPen 100 UNIT/ML Sopn  Pen Needles 32G X 4 MM Misc  Tresiba FlexTouch 200 UNIT/ML Sopn         FU   Follow-up Information       Martha Calhoun. Schedule an appointment as soon as possible for a visit in 3 day(s).    Specialty: Family Practice  Contact information:  [address]  [place] [address] [phone]               Billy Fair MD. Schedule an appointment as soon as possible for a visit in 1 week(s).    Specialty: ENDOCRINOLOGY  Contact information:  [address]    [place] [address] [phone]                             DC instructions:      Other Discharge Instructions:         Please check your blood sugars closely.    Please follow up with your PCP and endocrinologist in 1 week    If INSULIN PEN is ordered at discharge, remember to remove TWO caps from the needle before injecting.     Diabetes:  Your A1C level is greater than 8%.  It is recommended that you attend an outpatient diabetes education program.  Please discuss with your Primary Care Provider at your next visit to obtain a referral.  If you wish to make an appointment at [facility], call [phone].            I reconciled current and discharge medications on day of discharge, discussed changes with patient and noted changes above.       Total Time Coordinating Care: Greater than 30 minutes    Patient had opportunity to ask questions and state understand and agree with therapeutic plan as outlined    Thank You,    Broderick Rosas MD   Hospitalist with [facility]

## 2024-11-22 NOTE — PROGRESS NOTES
Patient received from [facility]. Patient is presently resting in bed, Alert x 4, Vital signs taken and stable. Fall precautions in place, call light within reach.

## 2024-11-22 NOTE — PLAN OF CARE
Problem: Patient Centered Care  Goal: Patient preferences are identified and integrated in the patient's plan of care  Description: Interventions:  - What would you like us to know as we care for you? Home with wife  - Provide timely, complete, and accurate information to patient/family  - Incorporate patient and family knowledge, values, beliefs, and cultural backgrounds into the planning and delivery of care  - Encourage patient/family to participate in care and decision-making at the level they choose  - Honor patient and family perspectives and choices  Outcome: Progressing     Problem: Diabetes/Glucose Control  Goal: Glucose maintained within prescribed range  Description: INTERVENTIONS:  - Monitor Blood Glucose as ordered  - Assess for signs and symptoms of hyperglycemia and hypoglycemia  - Administer ordered medications to maintain glucose within target range  - Assess barriers to adequate nutritional intake and initiate nutrition consult as needed  - Instruct patient on self management of diabetes  Outcome: Progressing     Problem: Patient/Family Goals  Goal: Patient/Family Long Term Goal  Description: Patient's Long Term Goal:     Interventions:    - See additional Care Plan goals for specific interventions  Outcome: Progressing  Goal: Patient/Family Short Term Goal  Description: Patient's Short Term Goal:     Interventions:   - See additional Care Plan goals for specific interventions  Outcome: Progressing     Problem: METABOLIC/FLUID AND ELECTROLYTES - ADULT  Goal: Glucose maintained within prescribed range  Description: INTERVENTIONS:  - Monitor Blood Glucose as ordered  - Assess for signs and symptoms of hyperglycemia and hypoglycemia  - Administer ordered medications to maintain glucose within target range  - Assess barriers to adequate nutritional intake and initiate nutrition consult as needed  - Instruct patient on self management of diabetes  Outcome: Progressing  Goal: Electrolytes maintained  within normal limits  Description: INTERVENTIONS:  - Monitor labs and rhythm and assess patient for signs and symptoms of electrolyte imbalances  - Administer electrolyte replacement as ordered  - Monitor response to electrolyte replacements, including rhythm and repeat lab results as appropriate  - Fluid restriction as ordered  - Instruct patient on fluid and nutrition restrictions as appropriate  Outcome: Progressing  Goal: Hemodynamic stability and optimal renal function maintained  Description: INTERVENTIONS:  - Monitor labs and assess for signs and symptoms of volume excess or deficit  - Monitor intake, output and patient weight  - Monitor urine specific gravity, serum osmolarity and serum sodium as indicated or ordered  - Monitor response to interventions for patient's volume status, including labs, urine output, blood pressure (other measures as available)  - Encourage oral intake as appropriate  - Instruct patient on fluid and nutrition restrictions as appropriate  Outcome: Progressing     Problem: SKIN/TISSUE INTEGRITY - ADULT  Goal: Skin integrity remains intact  Description: INTERVENTIONS  - Assess and document risk factors for pressure ulcer development  - Assess and document skin integrity  - Monitor for areas of redness and/or skin breakdown  - Initiate interventions, skin care algorithm/standards of care as needed  Outcome: Progressing

## 2024-11-22 NOTE — PROGRESS NOTES
[facility]  part of [facility]    Endocrine Progress Note  [facility]    Javon Rivas III Patient Status:  Inpatient    1970 MRN [id number]   Location [facility] 5SW/SE Attending Broderick Rosas MD   Hosp Day # 2 PCP LESVIA MARTINEZ       Assessment and Plan:   1.  Nonketotic hyperosmolar state and type 2 diabetes, resolved  2.  Uncontrolled type 2 diabetes  3.  Acute kidney injury, resolved  4.  Diabetic dyslipidemia  5.  Obesity  6.  SIDNEY       Ok for home on insulin today  Would be good candidate for GLP1 of GIP/GLP1 agonist therapy as outpatient  Cost was an issue in the past with Ozempic      Subjective:  feels better    Medications:    Current Facility-Administered Medications:     insulin degludec (Tresiba) 100 units/mL flextouch 50 Units, 50 Units, Subcutaneous, Daily    insulin aspart (NovoLOG) 100 Units/mL FlexPen 18 Units, 18 Units, Subcutaneous, TID CC    insulin aspart (NovoLOG) 100 Units/mL FlexPen 1-11 Units, 1-11 Units, Subcutaneous, TID CC    rosuvastatin (Crestor) tab 40 mg, 40 mg, Oral, Daily    glucose (Dex4) 15 GM/59ML oral liquid 15 g, 15 g, Oral, Q15 Min PRN **OR** glucose (Glutose) 40% oral gel 15 g, 15 g, Oral, Q15 Min PRN **OR** glucose-vitamin C (Dex-4) chewable tab 4 tablet, 4 tablet, Oral, Q15 Min PRN **OR** dextrose 50% injection 50 mL, 50 mL, Intravenous, Q15 Min PRN **OR** glucose (Dex4) 15 GM/59ML oral liquid 30 g, 30 g, Oral, Q15 Min PRN **OR** glucose (Glutose) 40% oral gel 30 g, 30 g, Oral, Q15 Min PRN **OR** glucose-vitamin C (Dex-4) chewable tab 8 tablet, 8 tablet, Oral, Q15 Min PRN    enoxaparin (Lovenox) 60 MG/0.6ML SUBQ injection 50 mg, 0.5 mg/kg, Subcutaneous, BID    acetaminophen (Tylenol Extra Strength) tab 1,000 mg, 1,000 mg, Oral, Q6H PRN    melatonin tab 3 mg, 3 mg, Oral, Nightly PRN    polyethylene glycol (PEG 3350) (Miralax) 17 g oral packet 17 g, 17 g, Oral, Daily PRN    sennosides (Senokot) tab 17.2 mg, 17.2 mg,  Oral, Nightly PRN    bisacodyl (Dulcolax) 10 MG rectal suppository 10 mg, 10 mg, Rectal, Daily PRN    ondansetron (Zofran) 4 MG/2ML injection 4 mg, 4 mg, Intravenous, Q6H PRN    Objective:   Blood pressure 144/86, pulse 74, temperature 99.2 °F (37.3 °C), temperature source Oral, resp. rate 18, height 4' 11\" (1.499 m), weight 225 lb 6.4 oz (102.2 kg), SpO2 96%.    Physical Exam:  General appearance: alert, appears stated age and cooperative  Abdominal: obese,non-distended      Results:     Lab Results   Component Value Date    WBC 7.1 11/22/2024    HGB 14.8 11/22/2024    HCT 45.1 11/22/2024    .0 11/22/2024    CREATSERUM 1.04 11/22/2024    BUN 17 11/22/2024     11/22/2024    K 4.8 11/22/2024     11/22/2024    CO2 23.0 11/22/2024     (H) 11/22/2024    CA 9.1 11/22/2024    MG 2.0 11/21/2024       No results found.       [unfilled]    CHECO FOX MD  11/22/2024

## 2024-11-22 NOTE — PROGRESS NOTES
Patient administered his lunchtime insulin stating that he knows he needs to remove 2 caps from the pen when he is at home. Patient needs more practice. Will continue to monitor technique.

## 2024-11-22 NOTE — PLAN OF CARE
Problem: Patient Centered Care  Goal: Patient preferences are identified and integrated in the patient's plan of care  Description: Interventions:  - What would you like us to know as we care for you?   - Provide timely, complete, and accurate information to patient/family  - Incorporate patient and family knowledge, values, beliefs, and cultural backgrounds into the planning and delivery of care  - Encourage patient/family to participate in care and decision-making at the level they choose  - Honor patient and family perspectives and choices  Outcome: Progressing     Problem: Diabetes/Glucose Control  Goal: Glucose maintained within prescribed range  Description: INTERVENTIONS:  - Monitor Blood Glucose as ordered  - Assess for signs and symptoms of hyperglycemia and hypoglycemia  - Administer ordered medications to maintain glucose within target range  - Assess barriers to adequate nutritional intake and initiate nutrition consult as needed  - Instruct patient on self management of diabetes  Outcome: Progressing     Problem: Patient/Family Goals  Goal: Patient/Family Long Term Goal  Description: Patient's Long Term Goal:     Interventions:    - See additional Care Plan goals for specific interventions  Outcome: Progressing  Goal: Patient/Family Short Term Goal  Description: Patient's Short Term Goal:     Interventions:   - See additional Care Plan goals for specific interventions  Outcome: Progressing     Problem: METABOLIC/FLUID AND ELECTROLYTES - ADULT  Goal: Glucose maintained within prescribed range  Description: INTERVENTIONS:  - Monitor Blood Glucose as ordered  - Assess for signs and symptoms of hyperglycemia and hypoglycemia  - Administer ordered medications to maintain glucose within target range  - Assess barriers to adequate nutritional intake and initiate nutrition consult as needed  - Instruct patient on self management of diabetes  Outcome: Progressing  Goal: Electrolytes maintained within normal  limits  Description: INTERVENTIONS:  - Monitor labs and rhythm and assess patient for signs and symptoms of electrolyte imbalances  - Administer electrolyte replacement as ordered  - Monitor response to electrolyte replacements, including rhythm and repeat lab results as appropriate  - Fluid restriction as ordered  - Instruct patient on fluid and nutrition restrictions as appropriate  Outcome: Progressing  Goal: Hemodynamic stability and optimal renal function maintained  Description: INTERVENTIONS:  - Monitor labs and assess for signs and symptoms of volume excess or deficit  - Monitor intake, output and patient weight  - Monitor urine specific gravity, serum osmolarity and serum sodium as indicated or ordered  - Monitor response to interventions for patient's volume status, including labs, urine output, blood pressure (other measures as available)  - Encourage oral intake as appropriate  - Instruct patient on fluid and nutrition restrictions as appropriate  Outcome: Progressing     Problem: SKIN/TISSUE INTEGRITY - ADULT  Goal: Skin integrity remains intact  Description: INTERVENTIONS  - Assess and document risk factors for pressure ulcer development  - Assess and document skin integrity  - Monitor for areas of redness and/or skin breakdown  - Initiate interventions, skin care algorithm/standards of care as needed  Outcome: Progressing